# Patient Record
Sex: MALE | Race: BLACK OR AFRICAN AMERICAN | Employment: UNEMPLOYED | URBAN - METROPOLITAN AREA
[De-identification: names, ages, dates, MRNs, and addresses within clinical notes are randomized per-mention and may not be internally consistent; named-entity substitution may affect disease eponyms.]

---

## 2019-12-03 VITALS
WEIGHT: 135 LBS | BODY MASS INDEX: 21.19 KG/M2 | RESPIRATION RATE: 16 BRPM | HEIGHT: 67 IN | TEMPERATURE: 97.2 F | DIASTOLIC BLOOD PRESSURE: 100 MMHG | HEART RATE: 57 BPM | SYSTOLIC BLOOD PRESSURE: 170 MMHG | OXYGEN SATURATION: 100 %

## 2019-12-04 ENCOUNTER — HOSPITAL ENCOUNTER (EMERGENCY)
Age: 29
Discharge: HOME | End: 2019-12-04
Payer: COMMERCIAL

## 2019-12-04 DIAGNOSIS — S01.511A LIP LACERATION, INITIAL ENCOUNTER: ICD-10-CM

## 2019-12-04 DIAGNOSIS — S01.81XA LACERATION OF FOREHEAD, INITIAL ENCOUNTER: Primary | ICD-10-CM

## 2019-12-04 PROCEDURE — 6370000000 HC RX 637 (ALT 250 FOR IP): Performed by: NURSE PRACTITIONER

## 2019-12-04 PROCEDURE — 90471 IMMUNIZATION ADMIN: CPT | Performed by: NURSE PRACTITIONER

## 2019-12-04 PROCEDURE — 90715 TDAP VACCINE 7 YRS/> IM: CPT | Performed by: NURSE PRACTITIONER

## 2019-12-04 PROCEDURE — 6360000002 HC RX W HCPCS: Performed by: NURSE PRACTITIONER

## 2019-12-04 PROCEDURE — 2500000003 HC RX 250 WO HCPCS: Performed by: NURSE PRACTITIONER

## 2019-12-04 PROCEDURE — 99282 EMERGENCY DEPT VISIT SF MDM: CPT

## 2019-12-04 PROCEDURE — 12013 RPR F/E/E/N/L/M 2.6-5.0 CM: CPT

## 2019-12-04 RX ORDER — LIDOCAINE HYDROCHLORIDE AND EPINEPHRINE 10; 10 MG/ML; UG/ML
20 INJECTION, SOLUTION INFILTRATION; PERINEURAL ONCE
Status: COMPLETED | OUTPATIENT
Start: 2019-12-04 | End: 2019-12-04

## 2019-12-04 RX ORDER — DIAPER,BRIEF,INFANT-TODD,DISP
EACH MISCELLANEOUS ONCE
Status: COMPLETED | OUTPATIENT
Start: 2019-12-04 | End: 2019-12-04

## 2019-12-04 RX ADMIN — Medication: at 02:00

## 2019-12-04 RX ADMIN — LIDOCAINE HYDROCHLORIDE,EPINEPHRINE BITARTRATE 20 ML: 10; .01 INJECTION, SOLUTION INFILTRATION; PERINEURAL at 02:00

## 2019-12-04 RX ADMIN — TETANUS TOXOID, REDUCED DIPHTHERIA TOXOID AND ACELLULAR PERTUSSIS VACCINE, ADSORBED 0.5 ML: 5; 2.5; 8; 8; 2.5 SUSPENSION INTRAMUSCULAR at 02:36

## 2019-12-04 ASSESSMENT — PAIN SCALES - GENERAL: PAINLEVEL_OUTOF10: 0

## 2020-06-30 ENCOUNTER — OFFICE VISIT (OUTPATIENT)
Dept: ORTHOPEDIC SURGERY | Age: 30
End: 2020-06-30

## 2020-06-30 VITALS
TEMPERATURE: 97.4 F | HEIGHT: 67 IN | WEIGHT: 135 LBS | SYSTOLIC BLOOD PRESSURE: 120 MMHG | DIASTOLIC BLOOD PRESSURE: 76 MMHG | BODY MASS INDEX: 21.19 KG/M2 | HEART RATE: 97 BPM

## 2020-06-30 PROCEDURE — 99202 OFFICE O/P NEW SF 15 MIN: CPT | Performed by: ORTHOPAEDIC SURGERY

## 2020-06-30 PROCEDURE — 99204 OFFICE O/P NEW MOD 45 MIN: CPT | Performed by: ORTHOPAEDIC SURGERY

## 2020-07-02 PROBLEM — S86.012A ACHILLES TENDON TEAR, LEFT, INITIAL ENCOUNTER: Status: ACTIVE | Noted: 2020-07-02

## 2020-07-02 NOTE — PROGRESS NOTES
Orthopaedic New Patient Note        Ryna Liang is a 34 y.o. male, his YOB: 1990 with the following history as recorded in [employer]:    Patient Active Problem List    Diagnosis Date Noted    Achilles tendon tear, left, initial encounter 07/02/2020     No current outpatient medications on file. No current facility-administered medications for this visit. Allergies: Patient has no known allergies. History reviewed. No pertinent past medical history. History reviewed. No pertinent surgical history. History reviewed. No pertinent family history. Social History     Tobacco Use    Smoking status: Never Smoker    Smokeless tobacco: Never Used   Substance Use Topics    Alcohol use: Yes                           Review of Systems   Constitutional: Negative for fever and chills. HENT: Negative for ear pain, sore throat and sinus pressure. Eyes: Negative for pain, discharge and redness. Respiratory: Negative for cough, shortness of breath and wheezing. Cardiovascular: Negative for chest pain. Gastrointestinal: Negative for nausea, vomiting, diarrhea and abdominal distention. Genitourinary: Negative for dysuria and frequency. Musculoskeletal: Negative for back pain and arthralgias. All other systems reviewed and negative. CC: Left ankle injury    S: Ryan Liang is a 34 y.o. who is here today for initial evaluation for his left ankle. Patient states this happened approximately 5 weeks ago where his playing basketball at his facility as he is an incarcerated inmate. Felt a pop to the back of the ankle had immediate pain. Was able to ambulate although this was difficult. Eventually had MRI of the left ankle demonstrating a Achilles tendon tear with retraction. He presents today for initial evaluation. Does note some weakness with plantarflexion although able to ambulate without assistive device. Denies any pre-existing injuries or issues with this ankle. Physical Exam  /76 (Site: Right Upper Arm, Position: Sitting, Cuff Size: Medium Adult)   Pulse 97   Temp 97.4 °F (36.3 °C)   Ht 5' 7\" (1.702 m)   Wt 135 lb (61.2 kg)   BMI 21.14 kg/m²   O:   CONSTITUTIONAL: awake, alert, cooperative, no apparent distress, and appears stated age  EYES: Lids and lashes normal, pupils equal, round and reactive to light, extra ocular muscles intact, sclera clear, conjunctiva normal  ENT: Normocephalic, without obvious abnormality, atraumatic, external ears without lesions, oral pharynx with moist mucus membranes  NECK: Trachea midline, skin normal  LUNGS: No increased work of breathing, good air exchange  CARDIOVASCULAR: 2+ pulses throughout and capillary Refill less than 2 seconds  ABDOMEN: soft, non-distended, non-tender and no rebound tenderness or guarding  NEUROLOGIC: Awake, alert, oriented to name, place and time. Cranial nerves II-XII are grossly intact. Motor is 5 out of 5 bilaterally. Sensory is intact. Left Lower Extremity Exam:  Skin intact, minimal swelling posterior ankle into Achilles tendon  No significant tenderness to palpation to the Achilles, there is palpable defects although palpable scar tissue formation  Patient able to actively plantarflex the ankle with 4+/5 strength relative to contralateral side  Demonstrates active knee flexion/extension, ankle plantar/dorsiflexion/great toe extension. States sensation intact to gross touch in sural/deep peroneal/superficial peroneal/saphenous/posterior tibial nerve distributions to foot/ankle. Palpable dorsalis pedis and posterior tibialis pulses, cap refill brisk in toes, foot warm/perfused. Compartments supple throughout thigh and leg, calves supple/NT    Outside MRI left ankle from 6/18/2020 report: Complete tear of Achilles tendon with cephalad retraction toward the musculoskeletal junction, fluid noted within the retro-Achilles space. ASSESSMENT:    ICD-10-CM    1.  Rupture of left Achilles tendon, initial encounter S86.012A    2. Achilles tendon tear, left, initial encounter S86.012A        PLAN:   Had lengthy discussion with patient today regarding his Achilles tendon tear and its subacute nature at this point. We did discuss in detail treatment options include nonoperative management versus operative treatment which would likely require grafting due to the chronicity of the injury. Discussed risk and benefits of each regarding nonoperative treatment and operative intervention. Patient has elected for nonsurgical treatment for his Achilles tendon, he does understand he will have permanent weakness with plantarflexion although he is willing to accept this  Discussed activity modifications for the next 2 months to allow for appropriate scar tissue and healing of his injury. Patient to follow-up with us PRN    Electronically Signed By  Linda Joseph DO  6/30/20    NOTE: This report was transcribed using voice recognition software.  Every effort was made to ensure accuracy; however, inadvertent computerized transcription errors may be present